# Patient Record
Sex: FEMALE | HISPANIC OR LATINO | Employment: STUDENT | URBAN - METROPOLITAN AREA
[De-identification: names, ages, dates, MRNs, and addresses within clinical notes are randomized per-mention and may not be internally consistent; named-entity substitution may affect disease eponyms.]

---
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
## 2025-06-01 ENCOUNTER — HOSPITAL ENCOUNTER (EMERGENCY)
Facility: HOSPITAL | Age: 20
Discharge: HOME | End: 2025-06-01
Attending: EMERGENCY MEDICINE | Admitting: EMERGENCY MEDICINE
Payer: MEDICARE

## 2025-06-01 VITALS
HEART RATE: 87 BPM | DIASTOLIC BLOOD PRESSURE: 66 MMHG | RESPIRATION RATE: 18 BRPM | SYSTOLIC BLOOD PRESSURE: 108 MMHG | TEMPERATURE: 98.2 F | OXYGEN SATURATION: 97 %

## 2025-06-01 DIAGNOSIS — N93.9 VAGINAL BLEEDING: Primary | ICD-10-CM

## 2025-06-01 DIAGNOSIS — R10.9 ABDOMINAL CRAMPING: ICD-10-CM

## 2025-06-01 LAB
ABO GROUP BLD: NORMAL
ALBUMIN SERPL BCG-MCNC: 4.7 G/DL (ref 3.5–5)
ALP SERPL-CCNC: 67 U/L (ref 34–104)
ALT SERPL W P-5'-P-CCNC: 10 U/L (ref 7–52)
ANION GAP SERPL CALCULATED.3IONS-SCNC: 9 MMOL/L (ref 4–13)
APTT PPP: 27 SECONDS (ref 23–34)
AST SERPL W P-5'-P-CCNC: 15 U/L (ref 13–39)
B-HCG SERPL-ACNC: <0.6 MIU/ML (ref 0–5)
BACTERIA UR QL AUTO: ABNORMAL /HPF
BASOPHILS # BLD AUTO: 0.04 THOUSANDS/ÂΜL (ref 0–0.1)
BASOPHILS NFR BLD AUTO: 0 % (ref 0–1)
BILIRUB SERPL-MCNC: 1.26 MG/DL (ref 0.2–1)
BILIRUB UR QL STRIP: NEGATIVE
BLD GP AB SCN SERPL QL: NEGATIVE
BUN SERPL-MCNC: 8 MG/DL (ref 5–25)
CALCIUM SERPL-MCNC: 10.1 MG/DL (ref 8.4–10.2)
CHLORIDE SERPL-SCNC: 103 MMOL/L (ref 96–108)
CLARITY UR: CLEAR
CO2 SERPL-SCNC: 27 MMOL/L (ref 21–32)
COLOR UR: ABNORMAL
CREAT SERPL-MCNC: 0.65 MG/DL (ref 0.6–1.3)
EOSINOPHIL # BLD AUTO: 0.15 THOUSAND/ÂΜL (ref 0–0.61)
EOSINOPHIL NFR BLD AUTO: 1 % (ref 0–6)
ERYTHROCYTE [DISTWIDTH] IN BLOOD BY AUTOMATED COUNT: 14.5 % (ref 11.6–15.1)
EXT PREGNANCY TEST URINE: NEGATIVE
EXT. CONTROL: NORMAL
GFR SERPL CREATININE-BSD FRML MDRD: 129 ML/MIN/1.73SQ M
GLUCOSE SERPL-MCNC: 95 MG/DL (ref 65–140)
GLUCOSE UR STRIP-MCNC: NEGATIVE MG/DL
HCT VFR BLD AUTO: 40.3 % (ref 34.8–46.1)
HGB BLD-MCNC: 13.8 G/DL (ref 11.5–15.4)
HGB UR QL STRIP.AUTO: ABNORMAL
HYALINE CASTS #/AREA URNS LPF: ABNORMAL /LPF
IMM GRANULOCYTES # BLD AUTO: 0.04 THOUSAND/UL (ref 0–0.2)
IMM GRANULOCYTES NFR BLD AUTO: 0 % (ref 0–2)
INR PPP: 1.05 (ref 0.85–1.19)
KETONES UR STRIP-MCNC: ABNORMAL MG/DL
LEUKOCYTE ESTERASE UR QL STRIP: NEGATIVE
LYMPHOCYTES # BLD AUTO: 3.03 THOUSANDS/ÂΜL (ref 0.6–4.47)
LYMPHOCYTES NFR BLD AUTO: 20 % (ref 14–44)
MAGNESIUM SERPL-MCNC: 1.9 MG/DL (ref 1.9–2.7)
MCH RBC QN AUTO: 29.1 PG (ref 26.8–34.3)
MCHC RBC AUTO-ENTMCNC: 34.2 G/DL (ref 31.4–37.4)
MCV RBC AUTO: 85 FL (ref 82–98)
MONOCYTES # BLD AUTO: 0.73 THOUSAND/ÂΜL (ref 0.17–1.22)
MONOCYTES NFR BLD AUTO: 5 % (ref 4–12)
MUCOUS THREADS UR QL AUTO: ABNORMAL
NEUTROPHILS # BLD AUTO: 10.99 THOUSANDS/ÂΜL (ref 1.85–7.62)
NEUTS SEG NFR BLD AUTO: 74 % (ref 43–75)
NITRITE UR QL STRIP: NEGATIVE
NON-SQ EPI CELLS URNS QL MICRO: ABNORMAL /HPF
NRBC BLD AUTO-RTO: 0 /100 WBCS
PH UR STRIP.AUTO: 6 [PH]
PLATELET # BLD AUTO: 311 THOUSANDS/UL (ref 149–390)
PMV BLD AUTO: 9.8 FL (ref 8.9–12.7)
POTASSIUM SERPL-SCNC: 3.2 MMOL/L (ref 3.5–5.3)
PROT SERPL-MCNC: 7.6 G/DL (ref 6.4–8.4)
PROT UR STRIP-MCNC: NEGATIVE MG/DL
PROTHROMBIN TIME: 13.9 SECONDS (ref 12.3–15)
RBC # BLD AUTO: 4.74 MILLION/UL (ref 3.81–5.12)
RBC #/AREA URNS AUTO: ABNORMAL /HPF
RH BLD: POSITIVE
SODIUM SERPL-SCNC: 139 MMOL/L (ref 135–147)
SP GR UR STRIP.AUTO: 1.02 (ref 1–1.03)
SPECIMEN EXPIRATION DATE: NORMAL
UROBILINOGEN UR STRIP-ACNC: <2 MG/DL
WBC # BLD AUTO: 14.98 THOUSAND/UL (ref 4.31–10.16)
WBC #/AREA URNS AUTO: ABNORMAL /HPF

## 2025-06-01 PROCEDURE — 86901 BLOOD TYPING SEROLOGIC RH(D): CPT | Performed by: EMERGENCY MEDICINE

## 2025-06-01 PROCEDURE — 96361 HYDRATE IV INFUSION ADD-ON: CPT

## 2025-06-01 PROCEDURE — 81025 URINE PREGNANCY TEST: CPT | Performed by: EMERGENCY MEDICINE

## 2025-06-01 PROCEDURE — 96374 THER/PROPH/DIAG INJ IV PUSH: CPT

## 2025-06-01 PROCEDURE — 85730 THROMBOPLASTIN TIME PARTIAL: CPT | Performed by: EMERGENCY MEDICINE

## 2025-06-01 PROCEDURE — 96375 TX/PRO/DX INJ NEW DRUG ADDON: CPT

## 2025-06-01 PROCEDURE — 36415 COLL VENOUS BLD VENIPUNCTURE: CPT | Performed by: EMERGENCY MEDICINE

## 2025-06-01 PROCEDURE — 85025 COMPLETE CBC W/AUTO DIFF WBC: CPT | Performed by: EMERGENCY MEDICINE

## 2025-06-01 PROCEDURE — 86850 RBC ANTIBODY SCREEN: CPT | Performed by: EMERGENCY MEDICINE

## 2025-06-01 PROCEDURE — 83735 ASSAY OF MAGNESIUM: CPT | Performed by: EMERGENCY MEDICINE

## 2025-06-01 PROCEDURE — 81001 URINALYSIS AUTO W/SCOPE: CPT | Performed by: EMERGENCY MEDICINE

## 2025-06-01 PROCEDURE — 85610 PROTHROMBIN TIME: CPT | Performed by: EMERGENCY MEDICINE

## 2025-06-01 PROCEDURE — 99284 EMERGENCY DEPT VISIT MOD MDM: CPT | Performed by: EMERGENCY MEDICINE

## 2025-06-01 PROCEDURE — 80053 COMPREHEN METABOLIC PANEL: CPT | Performed by: EMERGENCY MEDICINE

## 2025-06-01 PROCEDURE — 86900 BLOOD TYPING SEROLOGIC ABO: CPT | Performed by: EMERGENCY MEDICINE

## 2025-06-01 PROCEDURE — 84702 CHORIONIC GONADOTROPIN TEST: CPT | Performed by: EMERGENCY MEDICINE

## 2025-06-01 PROCEDURE — 99284 EMERGENCY DEPT VISIT MOD MDM: CPT

## 2025-06-01 RX ORDER — DIPHENHYDRAMINE HYDROCHLORIDE 50 MG/ML
25 INJECTION, SOLUTION INTRAMUSCULAR; INTRAVENOUS ONCE
Status: COMPLETED | OUTPATIENT
Start: 2025-06-01 | End: 2025-06-01

## 2025-06-01 RX ORDER — POTASSIUM CHLORIDE 1500 MG/1
40 TABLET, EXTENDED RELEASE ORAL ONCE
Status: COMPLETED | OUTPATIENT
Start: 2025-06-01 | End: 2025-06-01

## 2025-06-01 RX ORDER — DICYCLOMINE HCL 20 MG
20 TABLET ORAL 2 TIMES DAILY PRN
Qty: 4 TABLET | Refills: 0 | Status: SHIPPED | OUTPATIENT
Start: 2025-06-01 | End: 2025-06-03

## 2025-06-01 RX ORDER — KETOROLAC TROMETHAMINE 30 MG/ML
30 INJECTION, SOLUTION INTRAMUSCULAR; INTRAVENOUS ONCE
Status: COMPLETED | OUTPATIENT
Start: 2025-06-01 | End: 2025-06-01

## 2025-06-01 RX ORDER — METOCLOPRAMIDE HYDROCHLORIDE 5 MG/ML
10 INJECTION INTRAMUSCULAR; INTRAVENOUS ONCE
Status: COMPLETED | OUTPATIENT
Start: 2025-06-01 | End: 2025-06-01

## 2025-06-01 RX ORDER — ACETAMINOPHEN 325 MG/1
975 TABLET ORAL ONCE
Status: COMPLETED | OUTPATIENT
Start: 2025-06-01 | End: 2025-06-01

## 2025-06-01 RX ORDER — ONDANSETRON 4 MG/1
4 TABLET, ORALLY DISINTEGRATING ORAL EVERY 8 HOURS PRN
Qty: 15 TABLET | Refills: 0 | Status: SHIPPED | OUTPATIENT
Start: 2025-06-01

## 2025-06-01 RX ADMIN — POTASSIUM CHLORIDE 40 MEQ: 1500 TABLET, EXTENDED RELEASE ORAL at 16:52

## 2025-06-01 RX ADMIN — KETOROLAC TROMETHAMINE 30 MG: 30 INJECTION, SOLUTION INTRAMUSCULAR; INTRAVENOUS at 16:52

## 2025-06-01 RX ADMIN — SODIUM CHLORIDE 1000 ML: 0.9 INJECTION, SOLUTION INTRAVENOUS at 15:35

## 2025-06-01 RX ADMIN — DIPHENHYDRAMINE HYDROCHLORIDE 25 MG: 50 INJECTION INTRAMUSCULAR; INTRAVENOUS at 15:50

## 2025-06-01 RX ADMIN — METOCLOPRAMIDE 10 MG: 5 INJECTION, SOLUTION INTRAMUSCULAR; INTRAVENOUS at 15:50

## 2025-06-01 RX ADMIN — ACETAMINOPHEN 975 MG: 325 TABLET, FILM COATED ORAL at 15:50

## 2025-06-01 NOTE — ED NOTES
Pt writhing in wheel chair during vitals. Unable to obtain temperature.      Arin Rodriguez RN  06/01/25 4721

## 2025-06-01 NOTE — ED PROVIDER NOTES
Time reflects when diagnosis was documented in both MDM as applicable and the Disposition within this note       Time User Action Codes Description Comment    2025  4:03 PM Summer Apodaca Add [N93.9] Vaginal bleeding     2025  4:03 PM Summer Apodaca [R10.9] Abdominal cramping           ED Disposition       ED Disposition   Discharge    Condition   Stable    Date/Time   Sun 2025  4:35 PM    Comment   Mei James discharge to home/self care.                   Assessment & Plan       Medical Decision Making      Differential diagnosis includes but not limited to:  Appendicitis, viral syndrome, constipation, AMI, NSTEMI, pneumonia, pneuothorax, gerd, gastritis,  mesenteric ischemia, mesenteric adenitis, pancreatitis, cholecystitis, choledocholithiasis, hepatitis, bowel obstruction, ileus, gastroenteritis, colitis, malignancy, AAA, perforation, toxicologic poisoning, renal infarct, acute kidney injury, splenic infarct, splenic injury, nephrolithiasis, UTI, muscular strain, intra-abdominal hematoma, hernia, ovarian cyst, ovarian torsion, ectopic pregnancy, rectal prolapse, pain no rectal fistula, a no rectal fissures, hemorrhoids, perirectal abscess, threatened , PID, abruption, molar pregnancy, dislodged IUD, fibroid uterus, salpingitis, tubo-ovarian abscess, dysmenorrhea, cervicitis,    Will check labs to evaluate for electrolyte abnormality, JARAD, anemia, significant leukocytosis, pancreatitis, hepatitis and biliary etiology. Will also check urine for pregnancy and UTI      Amount and/or Complexity of Data Reviewed  Independent Historian: parent     Details:   Patient's mother and boyfriend       External Data Reviewed: notes.     Details:   No old pelvic ultrasounds to review  Labs: ordered. Decision-making details documented in ED Course.     Details:     Pregnancy negative  Urine with ketones.  Negative leukocytes or nitrites  No acute kidney injury electrolyte abnormality.   "Potassium mildly low at 3.2  Coags within normal range  Beta quant less than 0.6            Risk  OTC drugs.  Prescription drug management.        ED Course as of 06/01/25 1745   Sun Jun 01, 2025   1524 Patient is a 19-year-old female here with boyfriend mother coming in today with vaginal bleeding and cramping.  On exam she appears uncomfortable but nonperitoneal.  Will check UA, pregnancy, basic labs including beta quant.  Will give Reglan Benadryl and acetaminophen and symptomatic control.  Patient agreeable plan      Disclosure: Voice to text software was used in the preparation of this document and could have resulted in translational errors.      Occasional wrong word or \"sound a like\" substitutions may have occurred due to the inherent limitations of voice recognition software.  Read the chart carefully and recognize, using context, where substitutions have occurred.       I have independently reviewed external records are available to me to the level of detail possible within the time constraints of my patient care responsibilities in the ED.       1547 POCT pregnancy, urine  Negative       1550 CBC and differential(!)  Mildly elevated WBC       1603 UA (URINE) with reflex to Scope(!)  +Ketones       1634 Patient resting in bed.  Patient feels improved.  Discussed with her and updated her on labs.  Pregnancy negative.  Mild hypokalemia and will replace.    Patient understands.  No vomiting and abdominal pain is resolved.  Will give Toradol here as well as Zofran and Bentyl for   1641 Quantitative hCG             Medications   sodium chloride 0.9 % bolus 1,000 mL (0 mL Intravenous Stopped 6/1/25 1652)   metoclopramide (REGLAN) injection 10 mg (10 mg Intravenous Given 6/1/25 1550)   diphenhydrAMINE (BENADRYL) injection 25 mg (25 mg Intravenous Given 6/1/25 1550)   acetaminophen (TYLENOL) tablet 975 mg (975 mg Oral Given 6/1/25 1550)   ketorolac (TORADOL) injection 30 mg (30 mg Intravenous Given 6/1/25 1652) " "  potassium chloride (Klor-Con M20) CR tablet 40 mEq (40 mEq Oral Given 6/1/25 7782)       ED Risk Strat Scores              CRAFFT      Flowsheet Row Most Recent Value   CRABRIAT Initial Screen: During the past 12 months, did you:    1. Drink any alcohol (more than a few sips)?  No Filed at: 06/01/2025 5212   2. Smoke any marijuana or hashish No Filed at: 06/01/2025 8668   3. Use anything else to get high? (\"anything else\" includes illegal drugs, over the counter and prescription drugs, and things that you sniff or 'mercedes')? No Filed at: 06/01/2025 1539              No data recorded                            History of Present Illness       Chief Complaint   Patient presents with    Vaginal Bleeding     States she skipped her period last month and today got her period with very strong pelvic cramping. States she took a pregnancy test that was negative.        Past Medical History[1]   Past Surgical History[2]   Family History[3]   Social History[4]   E-Cigarette/Vaping      E-Cigarette/Vaping Substances      I have reviewed and agree with the history as documented.     Patient is a otherwise healthy 19-year-old female G0, P0 coming in today with abdominal pain, cramping and vaginal bleeding.  Patient states that she missed her period last month and then today started with her menses.  She states yesterday she was in normal state of health but today her cramps are not bearable.  She has diffuse abdominal pain and points to the suprapubic bilateral lower quadrants.  She has some nausea without any vomiting or diarrhea.  No abdominal surgeries, denies any STDs or abnormal Pap smears.  Patient states that she took 1 Aleve 40 minutes ago without any relief.  She has not been bleeding heavily and only changed her pads twice and just placed a tampon.  She denies any STD exposure.  She has no back pain, dysuria, urinary frequency.  She had no prior vaginal bleeding or spotting till today      History provided by:  Patient, " medical records, relative and parent   used: No    Vaginal Bleeding  Quality:  Heavier than menses  Severity:  Moderate  Onset quality:  Gradual  Timing:  Constant  Progression:  Unchanged  Chronicity:  New  Context: spontaneously    Relieved by:  None tried  Worsened by:  Nothing  Ineffective treatments:  OTC medications  Associated symptoms: abdominal pain and nausea    Associated symptoms: no back pain, no dizziness, no dyspareunia, no dysuria, no fatigue, no fever and no vaginal discharge    Risk factors: unprotected sex    Risk factors: no bleeding disorder, no hx of ectopic pregnancy, no hx of endometriosis, no gynecological surgery, does not have multiple partners, no new sexual partner, no ovarian cysts, no ovarian torsion, no PID, no prior miscarriage, no STD, no STD exposure and no terminated pregnancies        Review of Systems   Constitutional: Negative.  Negative for chills, fatigue and fever.   HENT: Negative.  Negative for ear pain and sore throat.    Eyes: Negative.  Negative for pain and visual disturbance.   Respiratory: Negative.  Negative for cough and shortness of breath.    Cardiovascular: Negative.  Negative for chest pain and palpitations.   Gastrointestinal:  Positive for abdominal pain and nausea. Negative for vomiting.   Genitourinary:  Positive for vaginal bleeding. Negative for dyspareunia, dysuria, hematuria and vaginal discharge.   Musculoskeletal: Negative.  Negative for arthralgias and back pain.   Skin:  Negative for color change and rash.   Neurological:  Negative for dizziness, seizures and syncope.   Hematological: Negative.    Psychiatric/Behavioral: Negative.     All other systems reviewed and are negative.          Objective       ED Triage Vitals   Temperature Pulse Blood Pressure Respirations SpO2 Patient Position - Orthostatic VS   06/01/25 1516 06/01/25 1456 06/01/25 1456 06/01/25 1456 06/01/25 1456 06/01/25 1456   98.2 °F (36.8 °C) (!) 107 (!) 139/107  20 96 % Sitting      Temp Source Heart Rate Source BP Location FiO2 (%) Pain Score    06/01/25 1516 06/01/25 1456 06/01/25 1456 -- 06/01/25 1550    Oral Monitor Right arm  8      Vitals      Date and Time Temp Pulse SpO2 Resp BP Pain Score FACES Pain Rating User   06/01/25 1553 -- 87 97 % 18 108/66 7 --    06/01/25 1550 -- -- -- -- -- 8 --    06/01/25 1516 98.2 °F (36.8 °C) -- -- -- -- -- --    06/01/25 1456 -- 107 96 % 20 139/107 -- -- HW            Physical Exam  Vitals and nursing note reviewed.   Constitutional:       General: She is awake. She is not in acute distress.     Appearance: Normal appearance. She is well-developed and normal weight.   HENT:      Head: Normocephalic and atraumatic.      Right Ear: External ear normal.      Left Ear: External ear normal.      Mouth/Throat:      Mouth: Mucous membranes are moist.     Eyes:      Conjunctiva/sclera: Conjunctivae normal.      Pupils: Pupils are equal, round, and reactive to light.       Cardiovascular:      Rate and Rhythm: Regular rhythm. Tachycardia present.      Pulses:           Radial pulses are 2+ on the right side and 2+ on the left side.        Dorsalis pedis pulses are 2+ on the right side and 2+ on the left side.      Heart sounds: Normal heart sounds and S2 normal. No murmur heard.  Pulmonary:      Effort: Pulmonary effort is normal. No respiratory distress.      Breath sounds: Normal breath sounds.      Comments: No conversational dyspnea  Abdominal:      General: Bowel sounds are normal.      Palpations: Abdomen is soft.      Tenderness: There is abdominal tenderness in the right lower quadrant, suprapubic area and left lower quadrant. There is no guarding or rebound. Negative signs include Weiss's sign, Rovsing's sign and McBurney's sign.     Musculoskeletal:         General: No swelling.      Cervical back: Normal range of motion and neck supple. No rigidity.      Right lower leg: No edema.      Left lower leg: No edema.     Skin:      General: Skin is warm and dry.      Capillary Refill: Capillary refill takes less than 2 seconds.     Neurological:      General: No focal deficit present.      Mental Status: She is alert and oriented to person, place, and time.      GCS: GCS eye subscore is 4. GCS verbal subscore is 5. GCS motor subscore is 6.      Cranial Nerves: Cranial nerves 2-12 are intact.      Sensory: Sensation is intact.      Motor: Motor function is intact.     Psychiatric:         Mood and Affect: Mood normal.         Behavior: Behavior is cooperative.         Results Reviewed       Procedure Component Value Units Date/Time    Quantitative hCG [[id number]]  (Normal) Collected: 06/01/25 1534    Lab Status: Final result Specimen: Blood from Arm, Left Updated: 06/01/25 1640     HCG, Quant <0.6 mIU/mL     Narrative:       Expected Ranges:    HCG results between 5.0 and 25.0 mIU/mL may be indicative of early pregnancy but should be interpreted in light of the total clinical presentation.    HCG can rise to detectable levels in yazan and post menopausal women (0-11.6 mIU/mL).     Approximate               Approximate HCG  Gestation age          Concentration ( mIU/mL)  _____________          ______________________   Weeks                      HCG values  0.2-1                       5-50  1-2                           2-3                         100-5000  3-4                         500-09605  4-5                         1000-74316  5-6                         [id number]  6-8                         [id number]  8-12                        [id number]      Urine Microscopic [[id number]]  (Abnormal) Collected: 06/01/25 1534    Lab Status: Final result Specimen: Urine, Clean Catch Updated: 06/01/25 1604     RBC, UA 4-10 /hpf      WBC, UA 1-2 /hpf      Epithelial Cells Occasional /hpf      Bacteria, UA None Seen /hpf      MUCUS THREADS Occasional     Hyaline Casts, UA 0-3 /lpf     Comprehensive metabolic panel [[id number]]  (Abnormal)  Collected: 06/01/25 1534    Lab Status: Final result Specimen: Blood from Arm, Left Updated: 06/01/25 1603     Sodium 139 mmol/L      Potassium 3.2 mmol/L      Chloride 103 mmol/L      CO2 27 mmol/L      ANION GAP 9 mmol/L      BUN 8 mg/dL      Creatinine 0.65 mg/dL      Glucose 95 mg/dL      Calcium 10.1 mg/dL      AST 15 U/L      ALT 10 U/L      Alkaline Phosphatase 67 U/L      Total Protein 7.6 g/dL      Albumin 4.7 g/dL      Total Bilirubin 1.26 mg/dL      eGFR 129 ml/min/1.73sq m     Narrative:      [employer] guidelines for Chronic Kidney Disease (CKD):     Stage 1 with normal or high GFR (GFR > 90 mL/min/1.73 square meters)    Stage 2 Mild CKD (GFR = 60-89 mL/min/1.73 square meters)    Stage 3A Moderate CKD (GFR = 45-59 mL/min/1.73 square meters)    Stage 3B Moderate CKD (GFR = 30-44 mL/min/1.73 square meters)    Stage 4 Severe CKD (GFR = 15-29 mL/min/1.73 square meters)    Stage 5 End Stage CKD (GFR <15 mL/min/1.73 square meters)  Note: GFR calculation is accurate only with a steady state creatinine    Magnesium [[id number]]  (Normal) Collected: 06/01/25 1534    Lab Status: Final result Specimen: Blood from Arm, Left Updated: 06/01/25 1603     Magnesium 1.9 mg/dL     UA (URINE) with reflex to Scope [[id number]]  (Abnormal) Collected: 06/01/25 1534    Lab Status: Final result Specimen: Urine, Clean Catch Updated: 06/01/25 1559     Color, UA Light Yellow     Clarity, UA Clear     Specific Gravity, UA 1.025     pH, UA 6.0     Leukocytes, UA Negative     Nitrite, UA Negative     Protein, UA Negative mg/dl      Glucose, UA Negative mg/dl      Ketones, UA Trace mg/dl      Urobilinogen, UA <2.0 mg/dl      Bilirubin, UA Negative     Occult Blood, UA Moderate    Protime-INR [[id number]]  (Normal) Collected: 06/01/25 1534    Lab Status: Final result Specimen: Blood from Arm, Left Updated: 06/01/25 1556     Protime 13.9 seconds      INR 1.05    Narrative:      INR Therapeutic Range    Indication                                              INR Range      Atrial Fibrillation                                               2.0-3.0  Hypercoagulable State                                    2.0.2.3  Left Ventricular Asist Device                            2.0-3.0  Mechanical Heart Valve                                  -    Aortic(with afib, MI, embolism, HF, LA enlargement,    and/or coagulopathy)                                     2.0-3.0 (2.5-3.5)     Mitral                                                             2.5-3.5  Prosthetic/Bioprosthetic Heart Valve               2.0-3.0  Venous thromboembolism (VTE: VT, PE        2.0-3.0    APTT [[id number]]  (Normal) Collected: 06/01/25 1534    Lab Status: Final result Specimen: Blood from Arm, Left Updated: 06/01/25 1556     PTT 27 seconds     CBC and differential [[id number]]  (Abnormal) Collected: 06/01/25 1534    Lab Status: Final result Specimen: Blood from Arm, Left Updated: 06/01/25 1549     WBC 14.98 Thousand/uL      RBC 4.74 Million/uL      Hemoglobin 13.8 g/dL      Hematocrit 40.3 %      MCV 85 fL      MCH 29.1 pg      MCHC 34.2 g/dL      RDW 14.5 %      MPV 9.8 fL      Platelets 311 Thousands/uL      nRBC 0 /100 WBCs      Segmented % 74 %      Immature Grans % 0 %      Lymphocytes % 20 %      Monocytes % 5 %      Eosinophils Relative 1 %      Basophils Relative 0 %      Absolute Neutrophils 10.99 Thousands/µL      Absolute Immature Grans 0.04 Thousand/uL      Absolute Lymphocytes 3.03 Thousands/µL      Absolute Monocytes 0.73 Thousand/µL      Eosinophils Absolute 0.15 Thousand/µL      Basophils Absolute 0.04 Thousands/µL     POCT pregnancy, urine [[id number]]  (Normal) Collected: 06/01/25 1535    Lab Status: Final result Updated: 06/01/25 1535     EXT Preg Test, Ur Negative     Control Valid            No orders to display       Procedures    ED Medication and Procedure Management   Prior to Admission Medications   Prescriptions Last Dose Informant Patient  Reported? Taking?   selenium sulfide (SELSUN) 2.5 % shampoo   No No   Sig: Apply daily for 2 weeks  then taper it for 6 months   Patient not taking: Reported on 8/3/2023      Facility-Administered Medications: None     Discharge Medication List as of 6/1/2025  4:36 PM        START taking these medications    Details   dicyclomine (BENTYL) 20 mg tablet Take 1 tablet (20 mg total) by mouth 2 (two) times a day as needed (abd pain/cramping) for up to 2 days, Starting Sun 6/1/2025, Until Tue 6/3/2025 at 2359, Normal      ondansetron (ZOFRAN-ODT) 4 mg disintegrating tablet Take 1 tablet (4 mg total) by mouth every 8 (eight) hours as needed for nausea or vomiting for up to 15 doses, Starting Sun 6/1/2025, Normal           CONTINUE these medications which have NOT CHANGED    Details   selenium sulfide (SELSUN) 2.5 % shampoo Apply daily for 2 weeks  then taper it for 6 months, Normal             ED SEPSIS DOCUMENTATION   Time reflects when diagnosis was documented in both MDM as applicable and the Disposition within this note       Time User Action Codes Description Comment    6/1/2025  4:03 PM Summer Apodaca [N93.9] Vaginal bleeding     6/1/2025  4:03 PM Summer Apodaca [R10.9] Abdominal cramping                    [1]   Past Medical History:  Diagnosis Date    Obesity    [2] No past surgical history on file.  [3] No family history on file.  [4]   Social History  Tobacco Use    Smoking status: Never    Smokeless tobacco: Never   Substance Use Topics    Alcohol use: Never    Drug use: Never        Summer Apodaca DO  06/01/25 2856